# Patient Record
Sex: FEMALE | Race: WHITE
[De-identification: names, ages, dates, MRNs, and addresses within clinical notes are randomized per-mention and may not be internally consistent; named-entity substitution may affect disease eponyms.]

---

## 2018-06-01 NOTE — HP
CC:  Dr. Lanier; Dr. Brandy Girard *

 

ADMISSION HISTORY AND PHYSICAL:

 

DATE OF ADMISSION:  06/08/18

 

ATTENDING SURGEON:  Dr. Darlin Perla.* (DICTATED BY SUSANA PEREIRA)

 

CHIEF COMPLAINT:  Left breast cancer.

 

HISTORY OF PRESENT ILLNESS:  This is a generally healthy 72-year-old 
, who first noted a nodule in the left breast on self-exam.  This 
was first noted in March.  She brought it to the attention of her primary care 
provider.  She underwent mammogram and ultrasonography, which did not reveal 
any abnormality.  She is status post aspiration of the breast cyst about 10 
years ago, but does not recall even which site it occurred on.  Her family 
history is positive for breast cancer in her mother diagnosed around age 85.  
No additional family history of breast or ovarian cancer.  The remainder of her 
breast and hormonal history is as outlined in her chart record.  She was seen 
in the office by Dr. Perla, on 05/09/18, at which time was noted a palpable 
left breast mass in the 6 o'clock position, described as hard, oval, irregular 
at the edges, but mobile.  This is located 2 cm from the nipple and by my exam 
today, it measures approximately 2 x 3 cm.  Per Dr. Perla's exam, no visible 
or palpable cervical or supraclavicular lymphadenopathy.  She was noted to have 
shotty bilateral axillary lymph nodes.  A biopsy was performed by pathology. 
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 
HER-2/brad negative. Dr. Perla has discussed with her the indications for 
surgery, the risks, benefits, and alternatives.  She would like to proceed as 
scheduled with left mastectomy with sentinel lymph node biopsy. 

 

PAST MEDICAL HISTORY:  The patient does not have any current active medical 
problems.  She states that her blood pressure is sometimes elevated in the 
medical office, but is normal when checked at home.

 

PAST SURGICAL HISTORY:  Her only previous surgery is dental extractions.

 

CURRENT MEDICATIONS:  She takes no prescription or over-the-counter medications 
on a regular basis.  She does take:

 

1.  Calcium 500 mg once daily.

2.  Multivitamin once daily.

3.  Vitamin E 400 International Units once daily.

 

DRUG ALLERGIES:  None known.

 

FAMILY HISTORY:  Negative for anesthesia problems, bleeding, or clotting 
disorders.

 

SOCIAL HISTORY:  The patient is .  She works as a .  She 
denies use of tobacco, alcohol, or recreational drugs.

 

REVIEW OF SYSTEMS:  General:  No recent constitutional symptoms or acute 
illnesses. Eyes: Possibly early cataracts, otherwise no recent changes.  Ears, 
Nose, and Throat:  No problems reported.  Cardiovascular:  No chest pain, 
palpitations, history of heart murmur.  Office hypertension as noted above.  
Respiratory:  No asthma, chronic cough, or shortness of breath.  GI:  No 
problems reported.  Last colonoscopy in 2014 with recommended 10-year follow 
and no interval problems reported.  :  No problems reported.  GYN:  Most 
recent pelvic exam and Pap smear approximately 2 years ago with no interval 
problems reported.  See also above per HPI.  Endocrine:  No diabetes or thyroid 
dysfunction.  Other system review is negative.

 

                               PHYSICAL EXAMINATION

 

GENERAL:  Well-nourished, well-developed female, in no acute distress.

 

VITAL SIGNS:  Height 5 feet 5 inches, weight 127 pounds, blood pressure 120/70, 
pulse 66, respirations 16.

 

HEENT:  Pupils are equal, round, reactive.  EOMs intact.  No conjunctival 
pallor. Oropharynx:  Teeth in good repair.  No intraoral lesions.

 

NECK:  No cervical or supraclavicular lymphadenopathy.  No palpable thyromegaly 
or masses.

 

LUNGS:  Clear to auscultation.  No rales or wheezes.

 

HEART:  Regular rate and rhythm.  No murmur appreciated.  Occasional irregular 
beat.

 

BREASTS:  My exam today was limited to left breast where there is noted between 
the 5 and 6 o'clock positions, approximately 2 cm from the nipple a firm, mobile
, well- circumscribed mass, which is nontender.  I did not reexamine her 
axillae.

 

ABDOMEN:  Soft and nontender to palpation.  No palpable masses or organomegaly.

 

BACK:  No spinous process or CVA tenderness.

 

GENITALIA:  Not done.

 

RECTAL:  Not done.

 

EXTREMITIES:  No edema.

 

NEUROLOGICAL:  Grossly intact.

 

SKIN:  Warm and dry.  No suspicious rashes or lesions noted.

 

 IMPRESSION:  Left breast cancer.

 

PLAN:  Left mastectomy with sentinel lymph node biopsy.

 

 ____________________________________ SUSANA PEREIRA

 

[id number]/[facility] #: [id number]

MTDLIYAH

## 2018-06-08 ENCOUNTER — HOSPITAL ENCOUNTER (OUTPATIENT)
Dept: HOSPITAL 25 - AA | Age: 73
Setting detail: OBSERVATION
LOS: 1 days | Discharge: HOME | End: 2018-06-09
Attending: SURGERY | Admitting: SURGERY
Payer: COMMERCIAL

## 2018-06-08 DIAGNOSIS — C50.912: Primary | ICD-10-CM

## 2018-06-08 PROCEDURE — 88307 TISSUE EXAM BY PATHOLOGIST: CPT

## 2018-06-08 PROCEDURE — 88342 IMHCHEM/IMCYTCHM 1ST ANTB: CPT

## 2018-06-08 PROCEDURE — 88360 TUMOR IMMUNOHISTOCHEM/MANUAL: CPT

## 2018-06-08 PROCEDURE — 0HBU0ZZ EXCISION OF LEFT BREAST, OPEN APPROACH: ICD-10-PCS | Performed by: SURGERY

## 2018-06-08 PROCEDURE — 78195 LYMPH SYSTEM IMAGING: CPT

## 2018-06-08 PROCEDURE — A9541 TC99M SULFUR COLLOID: HCPCS

## 2018-06-08 PROCEDURE — 88305 TISSUE EXAM BY PATHOLOGIST: CPT

## 2018-06-08 PROCEDURE — G0378 HOSPITAL OBSERVATION PER HR: HCPCS

## 2018-06-08 PROCEDURE — 0HBU3ZX EXCISION OF LEFT BREAST, PERCUTANEOUS APPROACH, DIAGNOSTIC: ICD-10-PCS | Performed by: SURGERY

## 2018-06-08 NOTE — XMS REPORT
Alma Zuluaga

 Created on:May 9, 2018



Patient:Alma Zuluaga

Sex:Female

:1945

External Reference #:[id number]





Demographics







 Address  [address] [place] [address]

 

 Home Phone  [phone]

 

 Email Address  [email or web address]

 

 Preferred Language  English

 

 Marital Status  Not  Or 

 

 Mu-ism Affiliation  Unknown

 

 Race  White

 

 Ethnic Group  Not  Or 









Author







 Organization  [employer]

 

 Address  [address]



   [place] [address]

 

 Phone  [phone]









Support







 Name  Relationship  Address  Phone

 

 Harry Zuluaga  Unavailable  Unavailable  [phone]









Care Team Providers







 Name  Role  Phone

 

 Edu Lanier MD  Primary Care Physician  Unavailable









Payers







 Type  Date  Identification Numbers  Payment Provider  Subscriber

 

 Commercial    Policy Number: [id number]  [facility]  Alma Zuluaga









 PayID: [id number]  [address]









 [place] [address]







Problems







 Description

 

 No Information







Social History







 Type  Date  Description  Comments

 

 Smoking    Patient has never smoked  







Allergies, Adverse Reactions, Alerts







 Date  Description  Reaction  Status  Severity  Comments

 

 2018  NKDA    active    







Medications







 Medication  Date  Status  Form  Strength  Qnty  SIG  Indications  Ordering



                 Provider

 

 Calcium    Active        500mg    Unknown



   00          twice a    



             day    

 

 Vitamin E    Active  Capsules  400Unit    1 by    Unknown



   00          mouth    



             every day    

 

 Multi-Day    Active  Tablets      1 by    Unknown



 Vitamins With  00          mouth    



 Iron            every day    







Vital Signs







 Date  Vital  Result  Comment

 

 2018  Height  65 inches  5'5"









 Weight  127.00 lb  

 

 Heart Rate  76 /min  

 

 BP Systolic  146 mmHg  

 

 BP Diastolic  86 mmHg  

 

 Respiratory Rate  16 /min  

 

 Body Temperature  98.6 F  

 

 BMI (Body Mass Index)  21.1 kg/m2  







Results







 Description

 

 No Information







Procedures







 Date  CPT Code  Description  Status

 

 2017    Mammogram  Completed

 

 2016    Mammogram  Completed

 

 2015    Mammogram  Completed

 

 2014    Mammogram  Completed

 

 2013    Mammogram  Completed

 

 2010    Mammogram  Completed







Plan of Care

2018 - Darlin Perla MDN63.20 Unspecified lump in the left breast, 
unspecified quadrantNew Labs:Cytology Non-GynSurgical PathologyFollow up:TBA 
after FNA results

## 2018-06-08 NOTE — RAD
INDICATION: LEFT breast cancer 6:00 position. May 08, 2018 mammogram and ultrasound

reviewed.



PROCEDURE: The risks and benefits of the procedure were explained to the patient. Written

informed consent was obtained.



0.312 mCi total of filtered sulfur colloid were injected intradermal in 4 divided doses

along the areolar margin flanking the 6:00 position tumor site. 



Spot images?were?obtained?of?the?thorax with the ipsilateral arm over the head. Solitary

ipsilateral axillary sentinal node marked on the skin utilizing a point source.



IMPRESSION: Successful lymphoscintigraphy LEFT breast.

## 2018-06-08 NOTE — XMS REPORT
Theresa Zuluaga

 Created on:2018



Patient:Theresa Zuluaga

Sex:Female

:1945

External Reference #:[id number]





Demographics







 Address  [address] [place] [address]

 

 Home Phone  [phone]

 

 Email Address  [email or web address]

 

 Preferred Language  English

 

 Marital Status  Not  Or 

 

 Quaker Affiliation  Unknown

 

 Race  White

 

 Ethnic Group  Not  Or 









Author







 Organization  [employer]

 

 Address  [address]



   [place] [address]

 

 Phone  [phone]









Support







 Name  Relationship  Address  Phone

 

 Harry Zuluaga  Unavailable  Unavailable  [phone]









Care Team Providers







 Name  Role  Phone

 

 Edu Lanier MD  Primary Care Physician  Unavailable









Payers







 Type  Date  Identification Numbers  Payment Provider  Subscriber

 

 Commercial    Policy Number: [id number]  [facility]  Theresa Zuluaga









 PayID: [id number]  [address]









 [place] [address]







Problems







 Description

 

 No Information







Social History







 Type  Date  Description  Comments

 

 Smoking    Patient has never smoked  







Allergies, Adverse Reactions, Alerts







 Date  Description  Reaction  Status  Severity  Comments

 

 2018  NKDA    active    







Medications







 Medication  Date  Status  Form  Strength  Qnty  SIG  Indications  Ordering



                 Provider

 

 Calcium    Active        500mg    Unknown



   00          once a    



             day    

 

 Vitamin E    Active  Capsules  400Unit    1 by    Unknown



   00          mouth    



             every day    

 

 Multi-Day    Active  Tablets      1 by    Unknown



 Vitamins With  00          mouth    



 Iron            every day    







Vital Signs







 Date  Vital  Result  Comment

 

 2018  Height  65 inches  5'5"









 Weight  127.00 lb  

 

 Heart Rate  66 /min  

 

 BP Systolic  120 mmHg  

 

 BP Diastolic  70 mmHg  

 

 Respiratory Rate  16 /min  

 

 Body Temperature  98.3 F  

 

 BMI (Body Mass Index)  21.1 kg/m2  









 2018  Height  65 inches  5'5"









 Weight  127.00 lb  

 

 Heart Rate  76 /min  

 

 BP Systolic  146 mmHg  

 

 BP Diastolic  86 mmHg  

 

 Respiratory Rate  16 /min  

 

 Body Temperature  98.6 F  

 

 BMI (Body Mass Index)  21.1 kg/m2  







Results







 Test  Date  Test  Result  H/L  Range  Note

 

 Laboratory test finding  2018  Cytology Non-Gyn  SEE RESULT BELOW      1









 Surgical Pathology  &lt;pending&gt;      









 1  SEE RESULT BELOW



   -----------------------------------------------------------------------------
---------------



   Name:  THERESA ZULUAGA                    : 1945    Attend Dr: Darlin Perla MD



   Acct:  [id number]  Unit: [id number]  AGE: 72            Location:  LAB



   Re18                        SEX: F             Status:    REG REF



   -----------------------------------------------------------------------------
---------------



   



   SPEC: [id number]             SARA:       SUBM DR: Darlin Perla MD



   REQ:  [id number]             RECD: 



   STATUS: ALVIN EGAN DR: Justin Lanier MD



   _



   ORDERED:  FNA INTERP RPT, FNA BY PALP, LEVEL 4, CYTO ADEQ-1ST P, IMMUNO-FIRST
, IMM



   IMMUNO-QUANT/3



   Addendum: The following histochemical stains are performed with appropriate 
controls on



   formalin fixed cell block material.



   



   



   ER         positive, 3+, greater than 90% of tumor cells



   MS         positive, 1?2 plus, 20% of tumor



   HER-2         negative (0+)



   CD68         positive in background inflammatory elements only.



   E-Cadherin     strong positive



   



   



   These findings support the previously rendered diagnosis of ductal 
adenocarcinoma of



   breast.



   



   Addendum Signed ______(signature on file)______ Justin Valenzuela MD 05/10/
18 1048



   



   -----------------------------------------------------------------------------
---------------



   



   FINAL DIAGNOSIS



   



   



   



   Breast, left, at 4 o?clock, fine needle aspiration by



   palpation:



   -- Malignant-  ductal adenocarcinoma.



   



   



   



   



   



   Comment: The aspirate smear and formalin fixed cell block



   material demonstrate identical findings both demonstrate



   classic features of low and intermediate grade ductal



   adenocarcinoma including abundant intact single epithelial



   elements with low to intermediate grade nuclear features



   including nuclear pleomorphism, irregular nuclear contours and



   



   ** CONTINUED ON NEXT PAGE **



   



   DEPARTMENT OF PATHOLOGY,  [address] [place] [address]



   Phone # [phone]      Fax #[phone]



   Justin Valenzuela M.D. Director     [facility] # [id number]



   



   



   



   RUN DATE: 05/10/18               [facility] **LIVE**         
       PAGE    2



   



   -----------------------------------------------------------------------------
---------------



   Patient: THERESA ZULUAGA                     [id number]     (Continued)



   -----------------------------------------------------------------------------
---------------



   



   SPECIMEN COMMENTS          (Continued)



   



   visible nucleoli.  A few fragments of benign-appearing ductal



   epithelium as well as background lymphoid and histiocytic



   inflammatory elements are seen.



   



   Additional studies including hormone receptor markers are



   pending on formalin fixed cell block material and will be



   reported in an addendum.



   



   Dr. Karimi has reviewed this case and concurs.



   



   



   Dr. Perla notified of these results on 2018 at



   approximately 1:30 PM



   



   



   The procedure was explained to and understood by the patient.



   Signed consent was obtained and a time out procedure was



   performed at the bedside to verify patient identity and biopsy



   site.  Fine needle aspiration biopsy was performed times 2



   with a 25 gauge needle on approximate 1 cm mobile left breast



   mass at 4:00, 2 cm from the areolar rim.  Adequacy was



   assessed by fast stain technique.  The procedure was tolerated



   well without complications.



   



   



   A cell block was prepared in the evaluation of this specimen.



   Smears and cell block reveal similar findings.



   BREAST LEFT - LEFT BREAST FINE NEEDLE ASPIRATION BY PALPATION



   



   CLINICAL HISTORY



   



   1 cm mobile left breast nodule at 4 o?clock 2 cm from areola.



   



   GROSS DESCRIPTION



   



   Fine needle aspiration by palpation x 2 with 1 Alcohol fixed slide(s) and 
needle rinse in



   formalin for cell block.



   



   Signed by and Reported on: __________              Justin Valenzuela MD  [id number]



   



   -----------------------------------------------------------------------------
---------------



   



   



   ** END OF REPORT **



   



   DEPARTMENT OF PATHOLOGY,  [address] [place] [address]



   Phone # [phone]      Fax #[phone]



   Justin Valenzuela M.D. Director     [facility] # [id number]







Procedures







 Date  CPT Code  Description  Status

 

 2018    Mammogram  Completed

 

 2017    Mammogram  Completed

 

 2016    Mammogram  Completed

 

 2015    Mammogram  Completed

 

 2014    Mammogram  Completed

 

 2013    Mammogram  Completed

 

 2010    Mammogram  Completed







Encounters







 Type  Date  Location  Provider  CPT E/M  Dx

 

 Office Visit  2018  [facility]  Darlin Perla MD  [id number]  
N63.20



   10:30a  [facility]      







Plan of Care

Future Appointment(s):2018 12:00 pm - Rosa Regan NP at 
[facility]2018 12:00 pm - Darlin Perla MD at [facility]

## 2018-06-09 VITALS — DIASTOLIC BLOOD PRESSURE: 87 MMHG | SYSTOLIC BLOOD PRESSURE: 155 MMHG

## 2018-06-09 NOTE — PN
Progress Note





- Progress Note


Date of Service: 06/09/18


Note: 





Surgery





Ms. Zuluaga denies complaints.  She is comfortable managing the drain.


 Vital Signs











  06/08/18 06/08/18 06/08/18





  16:32 16:35 16:40


 


Temperature 97.7 F  


 


Pulse Rate 94 83 85


 


Respiratory 12 18 12





Rate   


 


Blood Pressure 143/93 152/95 152/90





(mmHg)   


 


O2 Sat by Pulse 99 99 99





Oximetry   














  06/08/18 06/08/18 06/08/18





  16:45 17:00 17:15


 


Temperature   


 


Pulse Rate 81  


 


Respiratory 15 16 20





Rate   


 


Blood Pressure 158/98  





(mmHg)   


 


O2 Sat by Pulse 99 96 96





Oximetry   














  06/08/18 06/08/18 06/08/18





  17:22 17:30 17:36


 


Temperature   


 


Pulse Rate 67 60 


 


Respiratory 17 19 16





Rate   


 


Blood Pressure 152/95 155/96 155/96





(mmHg)   


 


O2 Sat by Pulse 98 98 94





Oximetry   














  06/08/18 06/08/18 06/08/18





  18:05 18:06 18:45


 


Temperature  97.3 F 97.7 F


 


Pulse Rate  60 55


 


Respiratory 16 16 18





Rate   


 


Blood Pressure  158/89 146/89





(mmHg)   


 


O2 Sat by Pulse  100 100





Oximetry   














  06/08/18 06/08/18 06/08/18





  19:44 21:42 21:44


 


Temperature 97.6 F  97.8 F


 


Pulse Rate 60  74


 


Respiratory 15 16 18





Rate   


 


Blood Pressure 156/88  143/79





(mmHg)   


 


O2 Sat by Pulse 99  99





Oximetry   














  06/08/18 06/09/18 06/09/18





  23:44 00:00 04:56


 


Temperature 98.6 F  


 


Pulse Rate 57  


 


Respiratory 16  





Rate   


 


Blood Pressure 136/74  





(mmHg)   


 


O2 Sat by Pulse 100 100 100





Oximetry   














  06/09/18 06/09/18





  04:57 07:15


 


Temperature 98.2 F 98.0 F


 


Pulse Rate 89 81


 


Respiratory 16 16





Rate  


 


Blood Pressure 151/89 155/87





(mmHg)  


 


O2 Sat by Pulse 99 100





Oximetry  











Mastectomy site:  clean, dry, flaps viable.


EDWIGE:  serosanguinous drainage.


 Intake & Output











 06/08/18 06/09/18 06/09/18





 22:59 06:59 14:59


 


Intake Total 2130 500 


 


Output Total 1050 1080 


 


Balance 1080 -580 


 


Intake:   


 


  IV Fluids 1550  


 


    LR 1500  


 


    NS 50ML, Cefazolin 2G 50  


 


  Oral 580 500 


 


Output:   


 


  EDWIGE #1 100 80 


 


  Urine 950 1000 











A/P:  POD#1 s/p mastectomy; doing well.  Can go home with JOSSIE Barajas

## 2018-06-09 NOTE — DS
DISCHARGE SUMMARY:

 

DATE OF ADMISSION:  06/08/18

 

DATE OF DISCHARGE:  06/09/18

 

ADMISSION DIAGNOSIS:  Left breast cancer.

 

DISCHARGE DIAGNOSIS:  Left breast cancer.

 

PROCEDURES DURING HOSPITALIZATION:  Included left mastectomy and sentinel lymph 
node biopsy done on the date of admission.

 

HOSPITAL COURSE:  Ms. Zuluaga is a 72-year-old woman with breast cancer.  Please 
see admission history and physical for details of findings at the time of 
admission.  She underwent surgery and postoperatively has been doing well with 
no problems.  Her incision on the postop day 1 showed no issues and she is 
considered stable for discharge.  She will be discharged to home with a drain 
in place and instructions to follow up as an outpatient.

 

 [id number]/[facility] #: [id number]

KENNETH

## 2018-06-09 NOTE — OP
CC:  [facility]; Edu Lanier MD *

 

DATE OF OPERATION:  06/08/18 - ROOM #334

 

DATE OF BIRTH:  [birth year]

 

SURGEON:  Darlin Perla MD

 

ASSISTANTS:  Rosa Regan NP, and SUSANA Small student.

 

PRE-OP DIAGNOSIS:  Left breast cancer.

 

POST-OP DIAGNOSIS:  Left breast cancer.

 

OPERATIVE PROCEDURE:  Left mastectomy and sentinel lymph node biopsy.

 

INDICATIONS:  Ms. Zuluaga is a 72-year-old woman recently diagnosed with breast 
cancer who has opted to have a mastectomy and sentinel node.  On the morning of 
surgery, she underwent sentinel lymph node localization without difficulty.  
She was then brought to the operating room and placed on the OR table in a 
supine position and given general anesthesia.

 

DESCRIPTION OF PROCEDURE:  The left breast and axilla were prepped and draped 
in the usual sterile fashion.  An incision was made along the superior breast 
encompassing the nipple areolar complex in a curvilinear fashion and 
subcutaneous tissue was divided with electrocautery.  Flaps were developed 
medially to the sternum, superiorly to the clavicle, and laterally to the 
latissimus dorsi muscle. Hemostasis was achieved with electrocautery.  Then, an 
inferior incision was made encompassing the nipple areolar complex and the mass 
and subcutaneous tissue was divided again with electrocautery.  Flaps were 
developed medially to the sternum, inferiorly to the recuts muscle, and 
laterally to the latissimus dorsi muscle.  The breast was then elevated off the 
chest wall using electrocautery.  Once it was removed, it was handed off as a 
specimen with the usual marks.  Hemostasis was assured and then attention was 
turned to identify the sentinel node using a navigator.  A sentinel node was 
identified fairly promptly and had elevated counts to a level of around 1500.  
Ex vivo counts were only around 1000 and this node was removed by sharp 
dissection dividing blood and lymphatic vessels that approached it between clips
, so search was made for a second sentinel node.  Initially, a second node was 
identified with counts around 700; however, when that was removed, it had no ex 
vivo count, so this was identified as a nonsentinel node.  Finally, a third 
sentinel node was found with initial in situ counts around 900.  It was excised 
in the usual fashion and ex vivo counts were comparable.  It was then handed 
off as sentinel node #2.  The wound was irrigated with saline.  Hemostasis was 
assured with electrocautery and then closure was accomplished.  This was done 
after placing a EDWIGE drain through a stab wound in the anterior-anterior axillary 
line after infiltrating with local anesthetic.  The 2-0 and 3-0 Vicryl were 
used to approximate the subcutaneous tissue; however, it should be mentioned 
that it was recognized that some additional tissue was preventing the the skin 
from lying flat in the superomedial portion of the flap, so this was excised 
and handed off with the breast specimen, identified as separate tissue within 
the container and its location identified.  Then, additional skin was taken 
from 2 places on the incision, so as to create a flatter contour of the 
closure.  Closure was accomplished with 2-0 and 3-0 Vicryl to reapproximate the 
subcutaneous tissue and the skin was closed with 4-0 Vicryl in a subcuticular 
fashion.  Steri-Strips and a dry fluffy dressing were applied.  The EDWIGE drain 
was secured to the chest wall with a 3-0 Prolene stitch.  All sponge and 
instrument counts were correct.  The patient tolerated the procedure well and 
was transferred to Recovery in a stable condition.

 

 [id number]/[facility] #: [id number]

[facility] CenterLIYAH

## 2019-04-23 ENCOUNTER — HOSPITAL ENCOUNTER (OUTPATIENT)
Dept: HOSPITAL 25 - OREAST | Age: 74
Discharge: HOME | End: 2019-04-23
Attending: OPHTHALMOLOGY
Payer: COMMERCIAL

## 2019-04-23 VITALS — SYSTOLIC BLOOD PRESSURE: 142 MMHG | DIASTOLIC BLOOD PRESSURE: 85 MMHG

## 2019-04-23 DIAGNOSIS — R94.31: ICD-10-CM

## 2019-04-23 DIAGNOSIS — Z85.3: ICD-10-CM

## 2019-04-23 DIAGNOSIS — H25.12: Primary | ICD-10-CM

## 2019-04-23 DIAGNOSIS — I10: ICD-10-CM

## 2019-04-23 PROCEDURE — V2788 PRESBYOPIA-CORRECT FUNCTION: HCPCS

## 2019-04-23 NOTE — OP
OPERATIVE REPORT:

 

DATE OF OPERATION:  04/23/19

 

DATE OF BIRTH:  [birth year]

 

SURGEON:  Adams Burk MD

 

ASSISTANT:  None.

 

PRE-OP DIAGNOSIS:  Cataract, left eye.

 

POST-OP DIAGNOSIS:  Cataract, left eye.

 

OPERATIVE PROCEDURE:  Phacoemulsification and cataract extraction with posterior chamber multifocal i
ntraocular lens implant.

 

COMPLICATIONS:  None.

 

BLOOD LOSS:  None.

 

ANESTHESIA:  Topical with intravenous sedation.

 

OPERATIVE FINDINGS:  The patient was brought to the operating room and received a drop of tetracaine 
to the left eye.  She was given intravenous sedation.  Her left eye was prepped and draped in the usu
al sterile fashion for ophthalmic surgery. Speculum was placed in the left eye.  A paracentesis was c
reated at the 5 o'clock position and 0.1 cc of 1% preservative-free lidocaine was injected to the ant
erior chamber followed by DisCoVisc.  The eye was digitally stabilized while a 2.75 mm keratome was u
sed to create a triplanar clear corneal incision at the 3 o'clock position.  A continuous curvilinear
 capsulorrhexis was created a using a cystotome and Utrata forceps.  BSS on a cannula was to hydrodis
sect the lens from the capsule.  Phacoemulsification was performed in a divide-and-conquer technique 
to create 4 fragments, which were removed.  Residual cortical material was removed with irrigation an
d aspiration.  ProVisc was used to inflate the capsular bag. Intraoperative tonometer was employed to
 show the pressure was appropriate.  The eye was lubricated with balance saline solution.  The ORA wa
s employed to help guide the lens choice.  An [id number] 11.0 diopter lens was folded into its cartridge 
and inserted into the eye.  Viscoelastic was removed using irrigation and aspiration.  BSS on a cannu
la was used to hydrate the corneal stroma and seal the wound.  At the end of the case, the pupil was 
round.  The lens was center and stable.  The eye pressure appeared normal and the wound was water tig
ht.  The speculum was removed and topical Maxitrol ointment was placed on the surface of the eye.  Th
e eye was closed, patched and shielded and the patient was sent to the recovery room in stable condit
ion with postoperative instructions and followup appointment given.

 

 [id number]/[id number] #: [id number]

## 2019-06-11 ENCOUNTER — HOSPITAL ENCOUNTER (OUTPATIENT)
Dept: HOSPITAL 25 - OREAST | Age: 74
Discharge: HOME | End: 2019-06-11
Attending: OPHTHALMOLOGY
Payer: COMMERCIAL

## 2019-06-11 VITALS — DIASTOLIC BLOOD PRESSURE: 89 MMHG | SYSTOLIC BLOOD PRESSURE: 125 MMHG

## 2019-06-11 DIAGNOSIS — H25.11: Primary | ICD-10-CM

## 2019-06-11 DIAGNOSIS — Z85.3: ICD-10-CM

## 2019-06-11 PROCEDURE — V2788 PRESBYOPIA-CORRECT FUNCTION: HCPCS

## 2019-06-11 NOTE — OP
DATE OF OPERATION:  06/1/1/19 [facility]

 

DATE OF BIRTH:  [birth year]

 

SURGEON:  Dr. Adams Burk.

 

ASSISTANT:  None.

 

ANESTHESIA:  Topical with intravenous sedation.

 

PRE-OP DIAGNOSIS:  Cataract, right eye.

 

POST-OP DIAGNOSIS:  Cataract, right eye.

 

OPERATIVE PROCEDURE:  Phacoemulsification and cataract extraction with 
posterior chamber multifocal intraocular lens implant, right eye.

 

COMPLICATIONS:  None.

 

BLOOD LOSS:  None.

 

DESCRIPTION OF PROCEDURE:  The patient was brought to the operating room and 
received intravenous sedation.  

A drop of tetracaine was placed in her right eye.  The patient was prepped and 
draped in the usual sterile fashion for ophthalmic surgery. Attention was 
directed to the right eye where a speculum was placed.  A paracentesis was 
created at the 11 o'clock position and 0.1 cc of 1% preservative- free 
lidocaine was injected into the anterior chamber followed by DisCoVisc.  The 
eye was digitally stabilized while a 2.75 mm keratome was used to create a 
triplanar clear corneal incision at the 9 o'clock position.  A continuous 
curvilinear capsulorrhexis was created with a cystotome and Utrata forceps.  
BSS on a cannula was used to hydrodissect the lens from the capsule.  
Phacoemulsification was performed in a divide-and-conquer technique to create 4 
fragments which were removed.  Residual cortical material was removed with 
irrigation and aspiration. The capsular bag was polished.  Provisc was used to 
inflate the capsular bag.  The intraoperative tonometer was used to check the 
eye pressure.  The surface of the eye was irrigated with balance saline 
solution.  The ORA instrument was employed. The lens choice was guided by the 
ORA as well as preoperative measurements.  An [id number] 13.5 diopter lens was 
folded and inserted into the capsular bag.  Irrigation and aspiration was 
performed to remove viscoelastic from the eye.  BSS on a cannula was used to 
hydrate the corneal stroma and seal the wound.  At the end of the case, the 
pupil was round.  The lens was centered and stable.  The eye pressure appeared 
normal and the wound was watertight.  The speculum was removed and topical 
Maxitrol ointment was placed on the surface of the eye.  The eye was closed, 
patched and shielded and the patient was sent to the recovery room in stable 
condition with postoperative instructions and followup appointment given.

 

 [id number]/CPS #: [id number]

[facility]